# Patient Record
Sex: FEMALE | URBAN - METROPOLITAN AREA
[De-identification: names, ages, dates, MRNs, and addresses within clinical notes are randomized per-mention and may not be internally consistent; named-entity substitution may affect disease eponyms.]

---

## 2023-12-22 ENCOUNTER — CLAIMS CREATED FROM THE CLAIM WINDOW (OUTPATIENT)
Dept: URBAN - METROPOLITAN AREA MEDICAL CENTER 19 | Facility: MEDICAL CENTER | Age: 58
End: 2023-12-22
Payer: MEDICARE

## 2023-12-22 DIAGNOSIS — K92.1 MELENA: ICD-10-CM

## 2023-12-22 DIAGNOSIS — D62 ACUTE POSTHEMORRHAGIC ANEMIA: ICD-10-CM

## 2023-12-22 PROCEDURE — 99254 IP/OBS CNSLTJ NEW/EST MOD 60: CPT | Performed by: INTERNAL MEDICINE

## 2023-12-22 PROCEDURE — 99222 1ST HOSP IP/OBS MODERATE 55: CPT | Performed by: INTERNAL MEDICINE

## 2023-12-23 ENCOUNTER — CLAIMS CREATED FROM THE CLAIM WINDOW (OUTPATIENT)
Dept: URBAN - METROPOLITAN AREA MEDICAL CENTER 19 | Facility: MEDICAL CENTER | Age: 58
End: 2023-12-23
Payer: MEDICARE

## 2023-12-23 DIAGNOSIS — K92.1 MELENA: ICD-10-CM

## 2023-12-23 DIAGNOSIS — D64.89 ANEMIA DUE TO OTHER CAUSE, NOT CLASSIFIED: ICD-10-CM

## 2023-12-23 DIAGNOSIS — R10.84 GENERALIZED ABDOMINAL PAIN: ICD-10-CM

## 2023-12-23 PROCEDURE — 99233 SBSQ HOSP IP/OBS HIGH 50: CPT | Performed by: INTERNAL MEDICINE

## 2023-12-27 ENCOUNTER — TELEPHONE ENCOUNTER (OUTPATIENT)
Dept: URBAN - METROPOLITAN AREA CLINIC 113 | Facility: CLINIC | Age: 58
End: 2023-12-27

## 2024-01-08 ENCOUNTER — TELEPHONE ENCOUNTER (OUTPATIENT)
Dept: URBAN - METROPOLITAN AREA CLINIC 113 | Facility: CLINIC | Age: 59
End: 2024-01-08

## 2024-01-30 ENCOUNTER — OFFICE VISIT (OUTPATIENT)
Dept: URBAN - METROPOLITAN AREA CLINIC 113 | Facility: CLINIC | Age: 59
End: 2024-01-30
Payer: MEDICARE

## 2024-01-30 ENCOUNTER — DASHBOARD ENCOUNTERS (OUTPATIENT)
Age: 59
End: 2024-01-30

## 2024-01-30 VITALS
TEMPERATURE: 97.8 F | DIASTOLIC BLOOD PRESSURE: 101 MMHG | HEART RATE: 79 BPM | WEIGHT: 275 LBS | RESPIRATION RATE: 16 BRPM | SYSTOLIC BLOOD PRESSURE: 190 MMHG | HEIGHT: 66 IN | BODY MASS INDEX: 44.2 KG/M2

## 2024-01-30 DIAGNOSIS — D50.0 IRON DEFICIENCY ANEMIA DUE TO CHRONIC BLOOD LOSS: ICD-10-CM

## 2024-01-30 DIAGNOSIS — Z86.010 HISTORY OF COLON POLYPS: ICD-10-CM

## 2024-01-30 DIAGNOSIS — K92.2 LOWER GI BLEED: ICD-10-CM

## 2024-01-30 PROBLEM — 428283002: Status: ACTIVE | Noted: 2024-01-30

## 2024-01-30 PROBLEM — 724556004: Status: ACTIVE | Noted: 2024-01-30

## 2024-01-30 PROCEDURE — 99214 OFFICE O/P EST MOD 30 MIN: CPT

## 2024-01-30 RX ORDER — POLYETHYLENE GLYCOL 3350, SODIUM CHLORIDE, SODIUM BICARBONATE, POTASSIUM CHLORIDE 420; 11.2; 5.72; 1.48 G/4L; G/4L; G/4L; G/4L
AS DIRECTED POWDER, FOR SOLUTION ORAL ONCE
Qty: 420 GM | Refills: 0 | OUTPATIENT
Start: 2024-01-30 | End: 2024-02-29

## 2024-01-30 RX ORDER — LOSARTAN POTASSIUM 100 MG/1
1 TABLET TABLET, FILM COATED ORAL ONCE A DAY
Status: ACTIVE | COMMUNITY

## 2024-01-30 NOTE — HPI-TODAY'S VISIT:
58-year-old female with a past medical history of hypertension presents for follow-up after hospitalization.  She was transferred from an outside hospital to [facility] in December 2023 for lower GI bleed.  She was consulted by Dr. White.  Differential was either diverticular or hemorrhoidal in nature with associated mild acute blood loss anemia.  She was not hemodynamically unstable.  Bleeding appeared to have stopped.  CT enterography was negative.  Patient had a colonoscopy performed 5 years prior in [place] but results unknown.  Given resolution of acute bleeding process an inpatient colonoscopy was deferred.  If bleeding resumes we would proceed with colonoscopy outpatient.  She had been discharged on a PPI.  She is felt this caused headaches.  As her bleed was lower, she was advised she may discontinue the PPI.  Her headache stopped instantly.  She has a history of "bleeding easily." She reportedly hemorrhaged after her tonsil removal in the past. She tested negative for hematologic disorders. She only had a few episodes of small volume BRBPR noted on the toilet paper when wiping following her discharge. Denies melena. Bowel movements are regular. Denies nausea, vomiting or abdominal pain. Denies reflux or dysphagia. She has recently restarted an oral iron supplement. Colonoscopy performed 5+ years ago in [place] was notable for a polyp. She was recommended to repeat in 5 years.

## 2024-01-31 LAB
ABSOLUTE BASOPHILS: 47
ABSOLUTE EOSINOPHILS: 130
ABSOLUTE LYMPHOCYTES: 1832
ABSOLUTE MONOCYTES: 837
ABSOLUTE NEUTROPHILS: 6454
BASOPHILS: 0.5
EOSINOPHILS: 1.4
FERRITIN, SERUM: 11
HEMATOCRIT: 38.6
HEMOGLOBIN: 12.2
IRON BIND.CAP.(TIBC): 442
IRON SATURATION: 58
IRON: 255
LYMPHOCYTES: 19.7
MCH: 27.2
MCHC: 31.6
MCV: 86
MONOCYTES: 9
MPV: 10.4
NEUTROPHILS: 69.4
PLATELET COUNT: 387
RDW: 16.5
RED BLOOD CELL COUNT: 4.49
WHITE BLOOD CELL COUNT: 9.3

## 2024-04-02 ENCOUNTER — COLON (OUTPATIENT)
Dept: URBAN - METROPOLITAN AREA SURGERY CENTER 25 | Facility: SURGERY CENTER | Age: 59
End: 2024-04-02

## 2024-04-02 RX ORDER — LOSARTAN POTASSIUM 100 MG/1
1 TABLET TABLET, FILM COATED ORAL ONCE A DAY
Status: ACTIVE | COMMUNITY

## 2024-06-21 ENCOUNTER — OFFICE VISIT (OUTPATIENT)
Dept: URBAN - METROPOLITAN AREA SURGERY CENTER 25 | Facility: SURGERY CENTER | Age: 59
End: 2024-06-21

## 2024-06-28 ENCOUNTER — OFFICE VISIT (OUTPATIENT)
Dept: URBAN - METROPOLITAN AREA SURGERY CENTER 25 | Facility: SURGERY CENTER | Age: 59
End: 2024-06-28

## 2024-07-11 ENCOUNTER — TELEPHONE ENCOUNTER (OUTPATIENT)
Dept: URBAN - METROPOLITAN AREA CLINIC 113 | Facility: CLINIC | Age: 59
End: 2024-07-11

## 2024-07-17 ENCOUNTER — TELEPHONE ENCOUNTER (OUTPATIENT)
Dept: URBAN - METROPOLITAN AREA CLINIC 113 | Facility: CLINIC | Age: 59
End: 2024-07-17

## 2024-08-05 ENCOUNTER — OFFICE VISIT (OUTPATIENT)
Dept: URBAN - METROPOLITAN AREA SURGERY CENTER 25 | Facility: SURGERY CENTER | Age: 59
End: 2024-08-05

## 2024-09-12 ENCOUNTER — OFFICE VISIT (OUTPATIENT)
Dept: URBAN - METROPOLITAN AREA SURGERY CENTER 25 | Facility: SURGERY CENTER | Age: 59
End: 2024-09-12